# Patient Record
Sex: FEMALE | Race: WHITE | NOT HISPANIC OR LATINO | Employment: OTHER | URBAN - METROPOLITAN AREA
[De-identification: names, ages, dates, MRNs, and addresses within clinical notes are randomized per-mention and may not be internally consistent; named-entity substitution may affect disease eponyms.]

---

## 2020-01-24 ENCOUNTER — APPOINTMENT (OUTPATIENT)
Dept: PREADMISSION TESTING | Facility: HOSPITAL | Age: 70
End: 2020-01-24

## 2020-01-27 ENCOUNTER — APPOINTMENT (OUTPATIENT)
Dept: PREADMISSION TESTING | Facility: HOSPITAL | Age: 70
End: 2020-01-27

## 2020-01-27 ENCOUNTER — HOSPITAL ENCOUNTER (OUTPATIENT)
Dept: GENERAL RADIOLOGY | Facility: HOSPITAL | Age: 70
Discharge: HOME | End: 2020-01-27
Admitting: ORTHOPAEDIC SURGERY

## 2020-01-27 VITALS — WEIGHT: 152.78 LBS | HEIGHT: 62 IN | BODY MASS INDEX: 28.11 KG/M2

## 2020-01-27 LAB
ALBUMIN SERPL-MCNC: 4.7 G/DL (ref 3.5–5.2)
ALBUMIN/GLOB SERPL: 1.8 G/DL
ALP SERPL-CCNC: 77 U/L (ref 39–117)
ALT SERPL W P-5'-P-CCNC: 24 U/L (ref 1–33)
ANION GAP SERPL CALCULATED.3IONS-SCNC: 13 MMOL/L (ref 5–15)
APTT PPP: 28.1 SECONDS (ref 24–37)
AST SERPL-CCNC: 30 U/L (ref 1–32)
BACTERIA UR QL AUTO: NORMAL /HPF
BASOPHILS # BLD AUTO: 0.1 10*3/MM3 (ref 0–0.2)
BASOPHILS NFR BLD AUTO: 1.3 % (ref 0–1.5)
BILIRUB SERPL-MCNC: 0.3 MG/DL (ref 0.2–1.2)
BILIRUB UR QL STRIP: NEGATIVE
BUN BLD-MCNC: 24 MG/DL (ref 8–23)
BUN/CREAT SERPL: 21.4 (ref 7–25)
CALCIUM SPEC-SCNC: 10 MG/DL (ref 8.6–10.5)
CHLORIDE SERPL-SCNC: 102 MMOL/L (ref 98–107)
CLARITY UR: CLEAR
CO2 SERPL-SCNC: 27 MMOL/L (ref 22–29)
COLOR UR: YELLOW
CREAT BLD-MCNC: 1.12 MG/DL (ref 0.57–1)
DEPRECATED RDW RBC AUTO: 43.7 FL (ref 37–54)
EOSINOPHIL # BLD AUTO: 0.28 10*3/MM3 (ref 0–0.4)
EOSINOPHIL NFR BLD AUTO: 3.6 % (ref 0.3–6.2)
ERYTHROCYTE [DISTWIDTH] IN BLOOD BY AUTOMATED COUNT: 13.5 % (ref 12.3–15.4)
GFR SERPL CREATININE-BSD FRML MDRD: 49 ML/MIN/1.73
GLOBULIN UR ELPH-MCNC: 2.6 GM/DL
GLUCOSE BLD-MCNC: 97 MG/DL (ref 65–99)
GLUCOSE UR STRIP-MCNC: NEGATIVE MG/DL
HBA1C MFR BLD: 5.6 % (ref 4.8–5.6)
HCT VFR BLD AUTO: 39.1 % (ref 34–46.6)
HGB BLD-MCNC: 12.5 G/DL (ref 12–15.9)
HGB UR QL STRIP.AUTO: NEGATIVE
HYALINE CASTS UR QL AUTO: NORMAL /LPF
IMM GRANULOCYTES # BLD AUTO: 0.02 10*3/MM3 (ref 0–0.05)
IMM GRANULOCYTES NFR BLD AUTO: 0.3 % (ref 0–0.5)
INR PPP: 1.06 (ref 0.85–1.16)
KETONES UR QL STRIP: NEGATIVE
LEUKOCYTE ESTERASE UR QL STRIP.AUTO: NEGATIVE
LYMPHOCYTES # BLD AUTO: 3.21 10*3/MM3 (ref 0.7–3.1)
LYMPHOCYTES NFR BLD AUTO: 41.5 % (ref 19.6–45.3)
MCH RBC QN AUTO: 28.2 PG (ref 26.6–33)
MCHC RBC AUTO-ENTMCNC: 32 G/DL (ref 31.5–35.7)
MCV RBC AUTO: 88.1 FL (ref 79–97)
MONOCYTES # BLD AUTO: 0.37 10*3/MM3 (ref 0.1–0.9)
MONOCYTES NFR BLD AUTO: 4.8 % (ref 5–12)
NEUTROPHILS # BLD AUTO: 3.76 10*3/MM3 (ref 1.7–7)
NEUTROPHILS NFR BLD AUTO: 48.5 % (ref 42.7–76)
NITRITE UR QL STRIP: NEGATIVE
NRBC BLD AUTO-RTO: 0 /100 WBC (ref 0–0.2)
PH UR STRIP.AUTO: <=5 [PH] (ref 5–8)
PLATELET # BLD AUTO: 253 10*3/MM3 (ref 140–450)
PMV BLD AUTO: 10.4 FL (ref 6–12)
POTASSIUM BLD-SCNC: 4.5 MMOL/L (ref 3.5–5.2)
PROT SERPL-MCNC: 7.3 G/DL (ref 6–8.5)
PROT UR QL STRIP: NEGATIVE
PROTHROMBIN TIME: 13.3 SECONDS (ref 11.2–14.3)
RBC # BLD AUTO: 4.44 10*6/MM3 (ref 3.77–5.28)
RBC # UR: NORMAL /HPF
REF LAB TEST METHOD: NORMAL
SODIUM BLD-SCNC: 142 MMOL/L (ref 136–145)
SP GR UR STRIP: 1.02 (ref 1–1.03)
SQUAMOUS #/AREA URNS HPF: NORMAL /HPF
UROBILINOGEN UR QL STRIP: NORMAL
WBC NRBC COR # BLD: 7.74 10*3/MM3 (ref 3.4–10.8)
WBC UR QL AUTO: NORMAL /HPF

## 2020-01-27 PROCEDURE — 83036 HEMOGLOBIN GLYCOSYLATED A1C: CPT | Performed by: ORTHOPAEDIC SURGERY

## 2020-01-27 PROCEDURE — 85610 PROTHROMBIN TIME: CPT | Performed by: ORTHOPAEDIC SURGERY

## 2020-01-27 PROCEDURE — 85025 COMPLETE CBC W/AUTO DIFF WBC: CPT | Performed by: ORTHOPAEDIC SURGERY

## 2020-01-27 PROCEDURE — 81001 URINALYSIS AUTO W/SCOPE: CPT | Performed by: ORTHOPAEDIC SURGERY

## 2020-01-27 PROCEDURE — 85730 THROMBOPLASTIN TIME PARTIAL: CPT | Performed by: ORTHOPAEDIC SURGERY

## 2020-01-27 PROCEDURE — 71046 X-RAY EXAM CHEST 2 VIEWS: CPT

## 2020-01-27 PROCEDURE — 36415 COLL VENOUS BLD VENIPUNCTURE: CPT

## 2020-01-27 PROCEDURE — 80053 COMPREHEN METABOLIC PANEL: CPT | Performed by: ORTHOPAEDIC SURGERY

## 2020-01-27 RX ORDER — MELOXICAM 15 MG/1
15 TABLET ORAL DAILY
COMMUNITY

## 2020-01-27 RX ORDER — LEVOTHYROXINE SODIUM 0.1 MG/1
100 TABLET ORAL DAILY
COMMUNITY

## 2020-01-27 RX ORDER — AMLODIPINE BESYLATE 5 MG/1
5 TABLET ORAL DAILY
COMMUNITY

## 2020-01-27 RX ORDER — ROSUVASTATIN CALCIUM 5 MG/1
5 TABLET, COATED ORAL DAILY
COMMUNITY

## 2020-01-30 ENCOUNTER — ANESTHESIA EVENT (OUTPATIENT)
Dept: PERIOP | Facility: HOSPITAL | Age: 70
End: 2020-01-30

## 2020-01-30 RX ORDER — FAMOTIDINE 10 MG/ML
20 INJECTION, SOLUTION INTRAVENOUS ONCE
Status: CANCELLED | OUTPATIENT
Start: 2020-01-30 | End: 2020-01-30

## 2020-01-31 ENCOUNTER — ANESTHESIA (OUTPATIENT)
Dept: PERIOP | Facility: HOSPITAL | Age: 70
End: 2020-01-31

## 2020-01-31 ENCOUNTER — HOSPITAL ENCOUNTER (INPATIENT)
Facility: HOSPITAL | Age: 70
LOS: 1 days | Discharge: HOME | End: 2020-02-01
Attending: ORTHOPAEDIC SURGERY | Admitting: ORTHOPAEDIC SURGERY

## 2020-01-31 ENCOUNTER — APPOINTMENT (OUTPATIENT)
Dept: GENERAL RADIOLOGY | Facility: HOSPITAL | Age: 70
End: 2020-01-31

## 2020-01-31 DIAGNOSIS — Z74.09 IMPAIRED MOBILITY AND ADLS: ICD-10-CM

## 2020-01-31 DIAGNOSIS — Z96.612 S/P REVERSE TOTAL SHOULDER ARTHROPLASTY, LEFT: Primary | ICD-10-CM

## 2020-01-31 DIAGNOSIS — Z74.1 REQUIRES ASSISTANCE WITH ACTIVITIES OF DAILY LIVING (ADL): ICD-10-CM

## 2020-01-31 DIAGNOSIS — Z78.9 IMPAIRED MOBILITY AND ADLS: ICD-10-CM

## 2020-01-31 PROBLEM — E78.5 HYPERLIPIDEMIA: Status: ACTIVE | Noted: 2020-01-31

## 2020-01-31 PROBLEM — M25.519 SHOULDER PAIN: Status: ACTIVE | Noted: 2020-01-31

## 2020-01-31 PROBLEM — E03.9 HYPOTHYROID: Status: ACTIVE | Noted: 2020-01-31

## 2020-01-31 PROBLEM — N28.9 RENAL INSUFFICIENCY: Status: ACTIVE | Noted: 2020-01-31

## 2020-01-31 PROBLEM — I10 HTN (HYPERTENSION): Status: ACTIVE | Noted: 2020-01-31

## 2020-01-31 PROCEDURE — 25010000003 CEFAZOLIN IN DEXTROSE 2-4 GM/100ML-% SOLUTION: Performed by: ORTHOPAEDIC SURGERY

## 2020-01-31 PROCEDURE — 25010000002 ROPIVACINE HCL-NACL: Performed by: NURSE ANESTHETIST, CERTIFIED REGISTERED

## 2020-01-31 PROCEDURE — 25010000002 BUPRENORPHINE PER 0.1 MG: Performed by: ANESTHESIOLOGY

## 2020-01-31 PROCEDURE — 25010000002 ONDANSETRON PER 1 MG: Performed by: ORTHOPAEDIC SURGERY

## 2020-01-31 PROCEDURE — 25010000002 VANCOMYCIN 1 G RECONSTITUTED SOLUTION: Performed by: ORTHOPAEDIC SURGERY

## 2020-01-31 PROCEDURE — 25010000002 PROPOFOL 10 MG/ML EMULSION: Performed by: NURSE ANESTHETIST, CERTIFIED REGISTERED

## 2020-01-31 PROCEDURE — 25010000002 DEXAMETHASONE PER 1 MG: Performed by: NURSE ANESTHETIST, CERTIFIED REGISTERED

## 2020-01-31 PROCEDURE — 25010000002 PHENYLEPHRINE PER 1 ML: Performed by: NURSE ANESTHETIST, CERTIFIED REGISTERED

## 2020-01-31 PROCEDURE — 25010000002 ONDANSETRON PER 1 MG: Performed by: NURSE ANESTHETIST, CERTIFIED REGISTERED

## 2020-01-31 PROCEDURE — 25010000002 NEOSTIGMINE 10 MG/10ML SOLUTION: Performed by: NURSE ANESTHETIST, CERTIFIED REGISTERED

## 2020-01-31 PROCEDURE — 94799 UNLISTED PULMONARY SVC/PX: CPT

## 2020-01-31 PROCEDURE — 25010000002 DEXAMETHASONE SODIUM PHOSPHATE 10 MG/ML SOLUTION: Performed by: ANESTHESIOLOGY

## 2020-01-31 PROCEDURE — C1776 JOINT DEVICE (IMPLANTABLE): HCPCS | Performed by: ORTHOPAEDIC SURGERY

## 2020-01-31 PROCEDURE — 25010000002 FENTANYL CITRATE (PF) 100 MCG/2ML SOLUTION: Performed by: ANESTHESIOLOGY

## 2020-01-31 PROCEDURE — 73020 X-RAY EXAM OF SHOULDER: CPT

## 2020-01-31 PROCEDURE — 0RRK00Z REPLACEMENT OF LEFT SHOULDER JOINT WITH REVERSE BALL AND SOCKET SYNTHETIC SUBSTITUTE, OPEN APPROACH: ICD-10-PCS | Performed by: ORTHOPAEDIC SURGERY

## 2020-01-31 DEVICE — BASEPLT SHLDR AEQUALIS FUL/WEDGE AUG 15D 25MM: Type: IMPLANTABLE DEVICE | Site: SHOULDER | Status: FUNCTIONAL

## 2020-01-31 DEVICE — SCRW AEQUALIS PERFORM PERIPH 5X26MM: Type: IMPLANTABLE DEVICE | Site: SHOULDER | Status: FUNCTIONAL

## 2020-01-31 DEVICE — IMPLANTABLE DEVICE
Type: IMPLANTABLE DEVICE | Site: SHOULDER | Status: FUNCTIONAL
Brand: FLEX SHOULDER SYSTEM

## 2020-01-31 DEVICE — GLENOSPHERE SHLDR AEQUALIS PERFORM STD 36MM: Type: IMPLANTABLE DEVICE | Site: SHOULDER | Status: FUNCTIONAL

## 2020-01-31 DEVICE — IMPLANTABLE DEVICE: Type: IMPLANTABLE DEVICE | Site: SHOULDER | Status: FUNCTIONAL

## 2020-01-31 DEVICE — SCRW AEQUALIS PERFORM CENTRL 6.5X30MM: Type: IMPLANTABLE DEVICE | Site: SHOULDER | Status: FUNCTIONAL

## 2020-01-31 DEVICE — SCRW AEQUALIS PERFORM PERIPH 5X30MM: Type: IMPLANTABLE DEVICE | Site: SHOULDER | Status: FUNCTIONAL

## 2020-01-31 DEVICE — IMPLANTABLE DEVICE
Type: IMPLANTABLE DEVICE | Site: SHOULDER | Status: FUNCTIONAL
Brand: AEQUALIS™ ASCEND™ FLEX

## 2020-01-31 RX ORDER — POVIDONE-IODINE 10 MG/G
OINTMENT TOPICAL AS NEEDED
Status: DISCONTINUED | OUTPATIENT
Start: 2020-01-31 | End: 2020-01-31 | Stop reason: HOSPADM

## 2020-01-31 RX ORDER — BUPIVACAINE HYDROCHLORIDE 2.5 MG/ML
INJECTION, SOLUTION EPIDURAL; INFILTRATION; INTRACAUDAL
Status: COMPLETED | OUTPATIENT
Start: 2020-01-31 | End: 2020-01-31

## 2020-01-31 RX ORDER — ACETAMINOPHEN 325 MG/1
650 TABLET ORAL EVERY 4 HOURS PRN
Status: DISCONTINUED | OUTPATIENT
Start: 2020-01-31 | End: 2020-02-01 | Stop reason: HOSPADM

## 2020-01-31 RX ORDER — ROSUVASTATIN CALCIUM 10 MG/1
5 TABLET, COATED ORAL NIGHTLY
Status: DISCONTINUED | OUTPATIENT
Start: 2020-01-31 | End: 2020-02-01 | Stop reason: HOSPADM

## 2020-01-31 RX ORDER — LIDOCAINE HYDROCHLORIDE 10 MG/ML
0.5 INJECTION, SOLUTION EPIDURAL; INFILTRATION; INTRACAUDAL; PERINEURAL ONCE AS NEEDED
Status: COMPLETED | OUTPATIENT
Start: 2020-01-31 | End: 2020-01-31

## 2020-01-31 RX ORDER — LEVOTHYROXINE SODIUM 0.1 MG/1
100 TABLET ORAL
Status: DISCONTINUED | OUTPATIENT
Start: 2020-01-31 | End: 2020-02-01 | Stop reason: HOSPADM

## 2020-01-31 RX ORDER — SODIUM CHLORIDE 0.9 % (FLUSH) 0.9 %
10 SYRINGE (ML) INJECTION EVERY 12 HOURS SCHEDULED
Status: DISCONTINUED | OUTPATIENT
Start: 2020-01-31 | End: 2020-01-31 | Stop reason: HOSPADM

## 2020-01-31 RX ORDER — CEFAZOLIN SODIUM 2 G/100ML
2 INJECTION, SOLUTION INTRAVENOUS EVERY 8 HOURS
Status: COMPLETED | OUTPATIENT
Start: 2020-01-31 | End: 2020-02-01

## 2020-01-31 RX ORDER — NALOXONE HCL 0.4 MG/ML
0.4 VIAL (ML) INJECTION AS NEEDED
Status: DISCONTINUED | OUTPATIENT
Start: 2020-01-31 | End: 2020-01-31 | Stop reason: HOSPADM

## 2020-01-31 RX ORDER — AMLODIPINE BESYLATE 5 MG/1
5 TABLET ORAL DAILY
Status: DISCONTINUED | OUTPATIENT
Start: 2020-01-31 | End: 2020-02-01 | Stop reason: HOSPADM

## 2020-01-31 RX ORDER — ONDANSETRON 2 MG/ML
4 INJECTION INTRAMUSCULAR; INTRAVENOUS ONCE AS NEEDED
Status: DISCONTINUED | OUTPATIENT
Start: 2020-01-31 | End: 2020-01-31 | Stop reason: HOSPADM

## 2020-01-31 RX ORDER — OXYCODONE HYDROCHLORIDE 5 MG/1
10 TABLET ORAL EVERY 4 HOURS PRN
Status: DISCONTINUED | OUTPATIENT
Start: 2020-01-31 | End: 2020-02-01 | Stop reason: HOSPADM

## 2020-01-31 RX ORDER — LIDOCAINE HYDROCHLORIDE 10 MG/ML
INJECTION, SOLUTION EPIDURAL; INFILTRATION; INTRACAUDAL; PERINEURAL AS NEEDED
Status: DISCONTINUED | OUTPATIENT
Start: 2020-01-31 | End: 2020-01-31 | Stop reason: SURG

## 2020-01-31 RX ORDER — FENTANYL CITRATE 50 UG/ML
INJECTION, SOLUTION INTRAMUSCULAR; INTRAVENOUS
Status: COMPLETED | OUTPATIENT
Start: 2020-01-31 | End: 2020-01-31

## 2020-01-31 RX ORDER — SODIUM CHLORIDE 450 MG/100ML
50 INJECTION, SOLUTION INTRAVENOUS CONTINUOUS
Status: DISCONTINUED | OUTPATIENT
Start: 2020-01-31 | End: 2020-02-01 | Stop reason: HOSPADM

## 2020-01-31 RX ORDER — DEXAMETHASONE SODIUM PHOSPHATE 10 MG/ML
INJECTION, SOLUTION INTRAMUSCULAR; INTRAVENOUS
Status: COMPLETED | OUTPATIENT
Start: 2020-01-31 | End: 2020-01-31

## 2020-01-31 RX ORDER — SODIUM CHLORIDE, SODIUM LACTATE, POTASSIUM CHLORIDE, CALCIUM CHLORIDE 600; 310; 30; 20 MG/100ML; MG/100ML; MG/100ML; MG/100ML
9 INJECTION, SOLUTION INTRAVENOUS CONTINUOUS
Status: DISCONTINUED | OUTPATIENT
Start: 2020-01-31 | End: 2020-01-31

## 2020-01-31 RX ORDER — PROPOFOL 10 MG/ML
VIAL (ML) INTRAVENOUS AS NEEDED
Status: DISCONTINUED | OUTPATIENT
Start: 2020-01-31 | End: 2020-01-31 | Stop reason: SURG

## 2020-01-31 RX ORDER — MEPERIDINE HYDROCHLORIDE 25 MG/ML
12.5 INJECTION INTRAMUSCULAR; INTRAVENOUS; SUBCUTANEOUS
Status: DISCONTINUED | OUTPATIENT
Start: 2020-01-31 | End: 2020-01-31 | Stop reason: HOSPADM

## 2020-01-31 RX ORDER — LABETALOL HYDROCHLORIDE 5 MG/ML
10 INJECTION, SOLUTION INTRAVENOUS EVERY 4 HOURS PRN
Status: DISCONTINUED | OUTPATIENT
Start: 2020-01-31 | End: 2020-02-01 | Stop reason: HOSPADM

## 2020-01-31 RX ORDER — KETOROLAC TROMETHAMINE 15 MG/ML
15 INJECTION, SOLUTION INTRAMUSCULAR; INTRAVENOUS EVERY 6 HOURS PRN
Status: DISCONTINUED | OUTPATIENT
Start: 2020-01-31 | End: 2020-02-01 | Stop reason: HOSPADM

## 2020-01-31 RX ORDER — ACETAMINOPHEN 325 MG/1
650 TABLET ORAL ONCE
Status: COMPLETED | OUTPATIENT
Start: 2020-01-31 | End: 2020-01-31

## 2020-01-31 RX ORDER — HYDROMORPHONE HYDROCHLORIDE 1 MG/ML
0.5 INJECTION, SOLUTION INTRAMUSCULAR; INTRAVENOUS; SUBCUTANEOUS
Status: DISCONTINUED | OUTPATIENT
Start: 2020-01-31 | End: 2020-01-31 | Stop reason: HOSPADM

## 2020-01-31 RX ORDER — PREGABALIN 75 MG/1
75 CAPSULE ORAL ONCE
Status: COMPLETED | OUTPATIENT
Start: 2020-01-31 | End: 2020-01-31

## 2020-01-31 RX ORDER — ONDANSETRON 2 MG/ML
INJECTION INTRAMUSCULAR; INTRAVENOUS AS NEEDED
Status: DISCONTINUED | OUTPATIENT
Start: 2020-01-31 | End: 2020-01-31 | Stop reason: SURG

## 2020-01-31 RX ORDER — NEOSTIGMINE METHYLSULFATE 1 MG/ML
INJECTION, SOLUTION INTRAVENOUS AS NEEDED
Status: DISCONTINUED | OUTPATIENT
Start: 2020-01-31 | End: 2020-01-31 | Stop reason: SURG

## 2020-01-31 RX ORDER — OXYCODONE HYDROCHLORIDE 5 MG/1
5 TABLET ORAL EVERY 4 HOURS PRN
Status: DISCONTINUED | OUTPATIENT
Start: 2020-01-31 | End: 2020-02-01 | Stop reason: HOSPADM

## 2020-01-31 RX ORDER — ONDANSETRON 4 MG/1
4 TABLET, FILM COATED ORAL EVERY 6 HOURS PRN
Status: DISCONTINUED | OUTPATIENT
Start: 2020-01-31 | End: 2020-02-01 | Stop reason: HOSPADM

## 2020-01-31 RX ORDER — ROCURONIUM BROMIDE 10 MG/ML
INJECTION, SOLUTION INTRAVENOUS AS NEEDED
Status: DISCONTINUED | OUTPATIENT
Start: 2020-01-31 | End: 2020-01-31 | Stop reason: SURG

## 2020-01-31 RX ORDER — DOCUSATE SODIUM 100 MG/1
100 CAPSULE, LIQUID FILLED ORAL 2 TIMES DAILY PRN
Status: DISCONTINUED | OUTPATIENT
Start: 2020-01-31 | End: 2020-02-01 | Stop reason: HOSPADM

## 2020-01-31 RX ORDER — VANCOMYCIN HYDROCHLORIDE 1 G/20ML
INJECTION, POWDER, LYOPHILIZED, FOR SOLUTION INTRAVENOUS AS NEEDED
Status: DISCONTINUED | OUTPATIENT
Start: 2020-01-31 | End: 2020-01-31 | Stop reason: HOSPADM

## 2020-01-31 RX ORDER — NALOXONE HCL 0.4 MG/ML
0.1 VIAL (ML) INJECTION
Status: DISCONTINUED | OUTPATIENT
Start: 2020-01-31 | End: 2020-02-01 | Stop reason: HOSPADM

## 2020-01-31 RX ORDER — FAMOTIDINE 20 MG/1
20 TABLET, FILM COATED ORAL ONCE
Status: COMPLETED | OUTPATIENT
Start: 2020-01-31 | End: 2020-01-31

## 2020-01-31 RX ORDER — BISACODYL 5 MG/1
10 TABLET, DELAYED RELEASE ORAL DAILY PRN
Status: DISCONTINUED | OUTPATIENT
Start: 2020-01-31 | End: 2020-02-01 | Stop reason: HOSPADM

## 2020-01-31 RX ORDER — GLYCOPYRROLATE 0.2 MG/ML
INJECTION INTRAMUSCULAR; INTRAVENOUS AS NEEDED
Status: DISCONTINUED | OUTPATIENT
Start: 2020-01-31 | End: 2020-01-31 | Stop reason: SURG

## 2020-01-31 RX ORDER — CEFAZOLIN SODIUM 2 G/100ML
2 INJECTION, SOLUTION INTRAVENOUS ONCE
Status: COMPLETED | OUTPATIENT
Start: 2020-01-31 | End: 2020-01-31

## 2020-01-31 RX ORDER — SODIUM CHLORIDE 0.9 % (FLUSH) 0.9 %
10 SYRINGE (ML) INJECTION AS NEEDED
Status: DISCONTINUED | OUTPATIENT
Start: 2020-01-31 | End: 2020-01-31 | Stop reason: HOSPADM

## 2020-01-31 RX ORDER — LABETALOL HYDROCHLORIDE 5 MG/ML
5 INJECTION, SOLUTION INTRAVENOUS
Status: DISCONTINUED | OUTPATIENT
Start: 2020-01-31 | End: 2020-01-31 | Stop reason: HOSPADM

## 2020-01-31 RX ORDER — EPHEDRINE SULFATE 50 MG/ML
5 INJECTION, SOLUTION INTRAVENOUS ONCE AS NEEDED
Status: DISCONTINUED | OUTPATIENT
Start: 2020-01-31 | End: 2020-01-31 | Stop reason: HOSPADM

## 2020-01-31 RX ORDER — SODIUM CHLORIDE, SODIUM LACTATE, POTASSIUM CHLORIDE, CALCIUM CHLORIDE 600; 310; 30; 20 MG/100ML; MG/100ML; MG/100ML; MG/100ML
100 INJECTION, SOLUTION INTRAVENOUS CONTINUOUS
Status: DISCONTINUED | OUTPATIENT
Start: 2020-01-31 | End: 2020-02-01 | Stop reason: HOSPADM

## 2020-01-31 RX ORDER — DEXAMETHASONE SODIUM PHOSPHATE 4 MG/ML
INJECTION, SOLUTION INTRA-ARTICULAR; INTRALESIONAL; INTRAMUSCULAR; INTRAVENOUS; SOFT TISSUE AS NEEDED
Status: DISCONTINUED | OUTPATIENT
Start: 2020-01-31 | End: 2020-01-31 | Stop reason: SURG

## 2020-01-31 RX ORDER — BUPRENORPHINE HYDROCHLORIDE 0.32 MG/ML
INJECTION INTRAMUSCULAR; INTRAVENOUS
Status: COMPLETED | OUTPATIENT
Start: 2020-01-31 | End: 2020-01-31

## 2020-01-31 RX ORDER — MAGNESIUM HYDROXIDE 1200 MG/15ML
LIQUID ORAL AS NEEDED
Status: DISCONTINUED | OUTPATIENT
Start: 2020-01-31 | End: 2020-01-31 | Stop reason: HOSPADM

## 2020-01-31 RX ORDER — ONDANSETRON 2 MG/ML
4 INJECTION INTRAMUSCULAR; INTRAVENOUS EVERY 6 HOURS PRN
Status: DISCONTINUED | OUTPATIENT
Start: 2020-01-31 | End: 2020-02-01 | Stop reason: HOSPADM

## 2020-01-31 RX ORDER — FENTANYL CITRATE 50 UG/ML
50 INJECTION, SOLUTION INTRAMUSCULAR; INTRAVENOUS
Status: DISCONTINUED | OUTPATIENT
Start: 2020-01-31 | End: 2020-01-31 | Stop reason: HOSPADM

## 2020-01-31 RX ADMIN — ONDANSETRON 4 MG: 2 INJECTION INTRAMUSCULAR; INTRAVENOUS at 14:03

## 2020-01-31 RX ADMIN — ACETAMINOPHEN 650 MG: 325 TABLET ORAL at 06:39

## 2020-01-31 RX ADMIN — FAMOTIDINE 20 MG: 20 TABLET, FILM COATED ORAL at 06:39

## 2020-01-31 RX ADMIN — PROPOFOL 150 MG: 10 INJECTION, EMULSION INTRAVENOUS at 07:32

## 2020-01-31 RX ADMIN — SODIUM CHLORIDE, POTASSIUM CHLORIDE, SODIUM LACTATE AND CALCIUM CHLORIDE: 600; 310; 30; 20 INJECTION, SOLUTION INTRAVENOUS at 09:15

## 2020-01-31 RX ADMIN — LIDOCAINE HYDROCHLORIDE 50 MG: 10 INJECTION, SOLUTION EPIDURAL; INFILTRATION; INTRACAUDAL; PERINEURAL at 07:32

## 2020-01-31 RX ADMIN — BUPIVACAINE HYDROCHLORIDE 30 ML: 2.5 INJECTION, SOLUTION EPIDURAL; INFILTRATION; INTRACAUDAL; PERINEURAL at 07:33

## 2020-01-31 RX ADMIN — AMLODIPINE BESYLATE 5 MG: 5 TABLET ORAL at 11:53

## 2020-01-31 RX ADMIN — GLYCOPYRROLATE 0.4 MG: 0.2 INJECTION, SOLUTION INTRAMUSCULAR; INTRAVENOUS at 09:16

## 2020-01-31 RX ADMIN — FENTANYL CITRATE 100 MCG: 50 INJECTION, SOLUTION INTRAMUSCULAR; INTRAVENOUS at 06:55

## 2020-01-31 RX ADMIN — PHENYLEPHRINE HYDROCHLORIDE 100 MCG: 10 INJECTION INTRAVENOUS at 07:47

## 2020-01-31 RX ADMIN — CEFAZOLIN SODIUM 2 G: 2 INJECTION, SOLUTION INTRAVENOUS at 14:49

## 2020-01-31 RX ADMIN — PREGABALIN 75 MG: 75 CAPSULE ORAL at 06:39

## 2020-01-31 RX ADMIN — ROSUVASTATIN CALCIUM 5 MG: 10 TABLET, COATED ORAL at 21:01

## 2020-01-31 RX ADMIN — CEFAZOLIN SODIUM 2 G: 2 INJECTION, SOLUTION INTRAVENOUS at 07:27

## 2020-01-31 RX ADMIN — LIDOCAINE HYDROCHLORIDE 0.5 ML: 10 INJECTION, SOLUTION EPIDURAL; INFILTRATION; INTRACAUDAL; PERINEURAL at 06:40

## 2020-01-31 RX ADMIN — LEVOTHYROXINE SODIUM 100 MCG: 100 TABLET ORAL at 11:53

## 2020-01-31 RX ADMIN — SODIUM CHLORIDE 50 ML/HR: 4.5 INJECTION, SOLUTION INTRAVENOUS at 11:53

## 2020-01-31 RX ADMIN — TRANEXAMIC ACID 1000 MG: 100 INJECTION, SOLUTION INTRAVENOUS at 08:57

## 2020-01-31 RX ADMIN — PHENYLEPHRINE HYDROCHLORIDE 100 MCG: 10 INJECTION INTRAVENOUS at 07:43

## 2020-01-31 RX ADMIN — ROPIVACAINE HYDROCHLORIDE 6 ML/HR: 5 INJECTION, SOLUTION EPIDURAL; INFILTRATION; PERINEURAL at 09:18

## 2020-01-31 RX ADMIN — DEXAMETHASONE SODIUM PHOSPHATE 8 MG: 4 INJECTION, SOLUTION INTRAMUSCULAR; INTRAVENOUS at 07:32

## 2020-01-31 RX ADMIN — ONDANSETRON 4 MG: 2 INJECTION INTRAMUSCULAR; INTRAVENOUS at 08:57

## 2020-01-31 RX ADMIN — CEFAZOLIN SODIUM 2 G: 2 INJECTION, SOLUTION INTRAVENOUS at 23:48

## 2020-01-31 RX ADMIN — BUPIVACAINE HYDROCHLORIDE 15 ML: 2.5 INJECTION, SOLUTION EPIDURAL; INFILTRATION; INTRACAUDAL; PERINEURAL at 06:55

## 2020-01-31 RX ADMIN — NEOSTIGMINE METHYLSULFATE 3 MG: 1 INJECTION, SOLUTION INTRAVENOUS at 09:16

## 2020-01-31 RX ADMIN — ROCURONIUM BROMIDE 40 MG: 10 INJECTION INTRAVENOUS at 07:32

## 2020-01-31 RX ADMIN — TRANEXAMIC ACID 1000 MG: 100 INJECTION, SOLUTION INTRAVENOUS at 07:36

## 2020-01-31 RX ADMIN — SODIUM CHLORIDE, POTASSIUM CHLORIDE, SODIUM LACTATE AND CALCIUM CHLORIDE 9 ML/HR: 600; 310; 30; 20 INJECTION, SOLUTION INTRAVENOUS at 06:40

## 2020-01-31 RX ADMIN — PHENYLEPHRINE HYDROCHLORIDE 100 MCG: 10 INJECTION INTRAVENOUS at 08:13

## 2020-01-31 RX ADMIN — BUPRENORPHINE HYDROCHLORIDE 0.3 MG: 0.32 INJECTION INTRAMUSCULAR; INTRAVENOUS at 07:33

## 2020-01-31 RX ADMIN — DEXAMETHASONE SODIUM PHOSPHATE 2 MG: 10 INJECTION INTRAMUSCULAR; INTRAVENOUS at 07:33

## 2020-01-31 NOTE — ANESTHESIA POSTPROCEDURE EVALUATION
Patient: Sandra Dolan    Procedure Summary     Date:  01/31/20 Room / Location:   [facility]  /  [facility]    Anesthesia Start:  0727 Anesthesia Stop:      Procedure:  TOTAL SHOULDER REVERSE ARTHROPLASTY WITH AUGMENTATION LEFT (Left Shoulder) Diagnosis:       Glenohumeral arthritis, left      (glenohumeral arthritis left)    Surgeon:  Mich Kendrick MD Provider:  Elliot Ren MD    Anesthesia Type:  general ASA Status:  2          Anesthesia Type: general    Vitals  Vitals Value Taken Time   BP     Temp     Pulse 99 1/31/2020  9:33 AM   Resp     SpO2     Vitals shown include unvalidated device data.        Post Anesthesia Care and Evaluation    Patient location during evaluation: PACU  Patient participation: complete - patient participated  Level of consciousness: awake and alert  Pain score: 0  Pain management: adequate  Airway patency: patent  Anesthetic complications: No anesthetic complications  PONV Status: none  Cardiovascular status: hemodynamically stable and acceptable  Respiratory status: nonlabored ventilation, acceptable and nasal cannula  Hydration status: acceptable

## 2020-01-31 NOTE — ANESTHESIA PROCEDURE NOTES
Airway  Urgency: elective    Date/Time: 1/31/2020 7:34 AM  Airway not difficult    General Information and Staff    Patient location during procedure: OR  CRNA: Paul Chris CRNA    Indications and Patient Condition  Indications for airway management: airway protection    Preoxygenated: yes  MILS not maintained throughout  Mask difficulty assessment: 1 - vent by mask    Final Airway Details  Final airway type: endotracheal airway      Successful airway: ETT  Cuffed: yes   Successful intubation technique: direct laryngoscopy  Endotracheal tube insertion site: oral  Blade: Haas  Blade size: 2  ETT size (mm): 7.0  Cormack-Lehane Classification: grade I - full view of glottis  Placement verified by: chest auscultation and capnometry   Cuff volume (mL): 5  Measured from: lips  ETT/EBT  to lips (cm): 21  Number of attempts at approach: 1    Additional Comments  Negative epigastric sounds, Breath sound equal bilaterally with symmetric chest rise and fall

## 2020-01-31 NOTE — ANESTHESIA PROCEDURE NOTES
Peripheral Block      Patient reassessed immediately prior to procedure    Patient location during procedure: OR  Reason for block: at surgeon's request and post-op pain management  Performed by  Anesthesiologist: Elliot Ren MD  Preanesthetic Checklist  Completed: patient identified, site marked, surgical consent, pre-op evaluation, timeout performed, IV checked, risks and benefits discussed and monitors and equipment checked  Prep:  Pt Position: supine  Sterile barriers:cap, gloves, gown and mask  Prep: ChloraPrep  Patient monitoring: blood pressure monitoring, continuous pulse oximetry and EKG  Procedure  Performed under: general  Guidance:ultrasound guided and landmark technique  Images:still images obtained, printed/placed on chart    Laterality:left  Block Type:PECS I and PECS II  Injection Technique:single-shot  Needle Type:short-bevel  Needle Gauge:20 G  Resistance on Injection: none    Medications Used: bupivacaine PF (MARCAINE) 0.25 % injection, 30 mL  dexamethasone sodium phosphate injection, 2 mg  buprenorphine (BUPRENEX) injection, 0.3 mg  Med admintered at 1/31/2020 7:33 AM      Medications  Preservative Free Saline:10ml  Comment:Block Injection:  Total volume of LA divided between Right and Left sided blocks         Post Assessment  Injection Assessment: negative aspiration for heme and incremental injection  Patient Tolerance:comfortable throughout block  Complications:no  Additional Notes  The pt. Was placed in the Supine Position and GA was induced     The insertion site was prepped with CHG and Ultrasound guidance with In-Plane techniquewas  a 4inch BBraun 360 degree echogenic needle was visualized.  Normal Saline PSF was  utilized for hydrodissection of tissue. PECS 1 Block- Pectoralis Major and Minor where identified and LA was injected between PMM and PmM at the level of the 3rd Rib(10ml),  PECS 2-  Pectoralis Minor and Serratus muscle where identified and the needle was advanced  laterally in-plane with the 4th rib as a backstop, pleura was monitored.  LA was injected between SA and PmM at the level of 4th rib( 20ml).  LA injection spread was visualized, injection was incremental 1-5ml, normal or low injection pressure, no intravascular injection, no pneumothorax appreciated.  Thank You.

## 2020-01-31 NOTE — ANESTHESIA PROCEDURE NOTES
Peripheral Block      Patient reassessed immediately prior to procedure    Patient location during procedure: pre-op  Reason for block: at surgeon's request and post-op pain management  Performed by  CRNA: Paul Chris CRNA  Preanesthetic Checklist  Completed: patient identified, site marked, surgical consent, pre-op evaluation, timeout performed, IV checked, risks and benefits discussed and monitors and equipment checked  Prep:  Sterile barriers:cap, gloves, mask and sterile barriers  Prep: ChloraPrep  Patient monitoring: blood pressure monitoring, continuous pulse oximetry and EKG  Procedure  Sedation:yes  Performed under: local infiltration  Guidance:ultrasound guided  Images:still images obtained, printed/placed on chart    Laterality:left  Block Type:interscalene  Injection Technique:catheter  Needle Type:Tuohy and echogenic  Needle Gauge:18 G  Resistance on Injection: none  Catheter Size:20 G (20g)  Cath Depth at skin: 7 cm    Medications Used: fentaNYL citrate (PF) (SUBLIMAZE) injection, 100 mcg  bupivacaine PF (MARCAINE) 0.25 % injection, 15 mL  Med admintered at 1/31/2020 6:55 AM      Post Assessment  Injection Assessment: negative aspiration for heme, no paresthesia on injection and incremental injection  Patient Tolerance:comfortable throughout block  Complications:no  Additional Notes  Procedure:                 The pt was placed in semifowlers position with a slight tilt of the thorax contralateral to the insertion site.  The Insertion Site was prepped and draped in sterile fashion.  The pt was anesthetized with  IV Sedation( see meds) and  Skin and cutaneous tissue was infiltrated and anesthetized with 1% Lidocaine 3 mls via a 25g needle.  Utilizing ultrasound guidance, a BBraun 2 inch 18 g Contiplex echogenic touhy needle was advanced in-plane.  Hydro dissection of tissue was achieved with Normal saline. Major vessels(carotid and Internal Jugular) where visualized as the brachial plexus was  approached at the approximate level of C-7/ T-1.  Cervical 5 and Branches of Cervical 6 nerve roots where visualized and the needle tip was placed posterior at the level of C-6 roots.  LA spread was visualized and injection was made incrementally every 5 mls with aspiration. Injection pressure was normal or little, there was no intraneural injection, no vascular injection.      The BBraun 20 g wire stylet  catheter was then placed under US guidance on the posterior aspect of the Brachial Plexus.  Location of catheter was confirmed with NS injection visualized with US . The tuohy was then removed and the skin was sealed with Skin AFix at catheter insertion site.  Skin was prepped with mastisol and the labeled catheter  was secured with steristrips and a CHG tegaderm. Thank You.

## 2020-01-31 NOTE — ANESTHESIA PREPROCEDURE EVALUATION
Anesthesia Evaluation     Patient summary reviewed and Nursing notes reviewed   history of anesthetic complications: PONV               Airway   Mallampati: II  TM distance: >3 FB  Neck ROM: full  No difficulty expected  Dental - normal exam     Pulmonary - negative pulmonary ROS and normal exam   Cardiovascular - normal exam    (+) hypertension, hyperlipidemia,       Neuro/Psych- negative ROS  GI/Hepatic/Renal/Endo    (+)   thyroid problem hypothyroidism    Musculoskeletal     Abdominal  - normal exam    Bowel sounds: normal.   Substance History - negative use     OB/GYN negative ob/gyn ROS         Other   arthritis,                      Anesthesia Plan    ASA 2     general   (Peripheral nerve block + cath for post op pain relief)  intravenous induction     Anesthetic plan, all risks, benefits, and alternatives have been provided, discussed and informed consent has been obtained with: patient.    Plan discussed with CRNA.

## 2020-02-01 VITALS
BODY MASS INDEX: 28.11 KG/M2 | RESPIRATION RATE: 16 BRPM | TEMPERATURE: 98.4 F | WEIGHT: 152.78 LBS | OXYGEN SATURATION: 96 % | HEART RATE: 89 BPM | SYSTOLIC BLOOD PRESSURE: 137 MMHG | HEIGHT: 62 IN | DIASTOLIC BLOOD PRESSURE: 68 MMHG

## 2020-02-01 LAB
ANION GAP SERPL CALCULATED.3IONS-SCNC: 13 MMOL/L (ref 5–15)
BASOPHILS # BLD AUTO: 0.02 10*3/MM3 (ref 0–0.2)
BASOPHILS NFR BLD AUTO: 0.2 % (ref 0–1.5)
BUN BLD-MCNC: 22 MG/DL (ref 8–23)
BUN/CREAT SERPL: 22.9 (ref 7–25)
CALCIUM SPEC-SCNC: 9.1 MG/DL (ref 8.6–10.5)
CHLORIDE SERPL-SCNC: 103 MMOL/L (ref 98–107)
CO2 SERPL-SCNC: 24 MMOL/L (ref 22–29)
CREAT BLD-MCNC: 0.96 MG/DL (ref 0.57–1)
DEPRECATED RDW RBC AUTO: 44.2 FL (ref 37–54)
EOSINOPHIL # BLD AUTO: 0 10*3/MM3 (ref 0–0.4)
EOSINOPHIL NFR BLD AUTO: 0 % (ref 0.3–6.2)
ERYTHROCYTE [DISTWIDTH] IN BLOOD BY AUTOMATED COUNT: 13.5 % (ref 12.3–15.4)
GFR SERPL CREATININE-BSD FRML MDRD: 58 ML/MIN/1.73
GLUCOSE BLD-MCNC: 146 MG/DL (ref 65–99)
HCT VFR BLD AUTO: 36.9 % (ref 34–46.6)
HGB BLD-MCNC: 11.8 G/DL (ref 12–15.9)
IMM GRANULOCYTES # BLD AUTO: 0.17 10*3/MM3 (ref 0–0.05)
IMM GRANULOCYTES NFR BLD AUTO: 1.3 % (ref 0–0.5)
LYMPHOCYTES # BLD AUTO: 1.05 10*3/MM3 (ref 0.7–3.1)
LYMPHOCYTES NFR BLD AUTO: 8.2 % (ref 19.6–45.3)
MCH RBC QN AUTO: 28.4 PG (ref 26.6–33)
MCHC RBC AUTO-ENTMCNC: 32 G/DL (ref 31.5–35.7)
MCV RBC AUTO: 88.9 FL (ref 79–97)
MONOCYTES # BLD AUTO: 0.38 10*3/MM3 (ref 0.1–0.9)
MONOCYTES NFR BLD AUTO: 3 % (ref 5–12)
NEUTROPHILS # BLD AUTO: 11.25 10*3/MM3 (ref 1.7–7)
NEUTROPHILS NFR BLD AUTO: 87.3 % (ref 42.7–76)
NRBC BLD AUTO-RTO: 0 /100 WBC (ref 0–0.2)
PLAT MORPH BLD: NORMAL
PLATELET # BLD AUTO: 231 10*3/MM3 (ref 140–450)
PMV BLD AUTO: 10.8 FL (ref 6–12)
POTASSIUM BLD-SCNC: 4.5 MMOL/L (ref 3.5–5.2)
RBC # BLD AUTO: 4.15 10*6/MM3 (ref 3.77–5.28)
RBC MORPH BLD: NORMAL
SODIUM BLD-SCNC: 140 MMOL/L (ref 136–145)
WBC MORPH BLD: NORMAL
WBC NRBC COR # BLD: 12.87 10*3/MM3 (ref 3.4–10.8)

## 2020-02-01 PROCEDURE — 80048 BASIC METABOLIC PNL TOTAL CA: CPT | Performed by: ORTHOPAEDIC SURGERY

## 2020-02-01 PROCEDURE — 85025 COMPLETE CBC W/AUTO DIFF WBC: CPT | Performed by: ORTHOPAEDIC SURGERY

## 2020-02-01 PROCEDURE — 97165 OT EVAL LOW COMPLEX 30 MIN: CPT

## 2020-02-01 PROCEDURE — 85007 BL SMEAR W/DIFF WBC COUNT: CPT | Performed by: ORTHOPAEDIC SURGERY

## 2020-02-01 PROCEDURE — 97530 THERAPEUTIC ACTIVITIES: CPT

## 2020-02-01 RX ORDER — OXYCODONE HYDROCHLORIDE 5 MG/1
5 TABLET ORAL EVERY 4 HOURS PRN
Qty: 40 TABLET | Refills: 0 | Status: SHIPPED | OUTPATIENT
Start: 2020-02-01 | End: 2020-02-10

## 2020-02-01 RX ADMIN — AMLODIPINE BESYLATE 5 MG: 5 TABLET ORAL at 08:53

## 2020-02-01 RX ADMIN — LEVOTHYROXINE SODIUM 100 MCG: 100 TABLET ORAL at 05:37

## 2020-02-01 RX ADMIN — ACETAMINOPHEN 650 MG: 325 TABLET, FILM COATED ORAL at 08:54

## 2020-02-01 NOTE — PROGRESS NOTES
Orthopedic Daily Progress Note      CC: reverse TSA    Pain well controlled  General: no fevers, chills  Abdomen: no nausea, vomiting, or diarrhea    No other complaints    Physical Exam:  I have reviewed the vital signs.  Temp:  [97 °F (36.1 °C)-98.8 °F (37.1 °C)] 98.4 °F (36.9 °C)  Heart Rate:  [] 89  Resp:  [14-18] 16  BP: (112-142)/(62-76) 137/68    Objective  General Appearance:    Alert, cooperative, no distress  Extremities: No clubbing, cyanosis, or edema to lower extremities  Pulses:  2+ in distal surgical extremity  Skin: Dressing Clean/dry/intact      Results Review:    I have reviewed the labs, radiology results and diagnostic studies:    Results from last 7 days   Lab Units 02/01/20  0535   WBC 10*3/mm3 12.87*   HEMOGLOBIN g/dL 11.8*   PLATELETS 10*3/mm3 231     Results from last 7 days   Lab Units 02/01/20  0535   SODIUM mmol/L 140   POTASSIUM mmol/L 4.5   CO2 mmol/L 24.0   CREATININE mg/dL 0.96   GLUCOSE mg/dL 146*       I have reviewed the medications.    Assessment/Problem List  POD# 1 Day Post-Op   S/p reverse TSA    Plan  1) ot  2) limit narcotics  3) sling at all times except bathing dressing and changing        Discharge Planning: I expect patient to be discharged to home today.    Mich Kendrick MD  02/01/20  8:15 AM

## 2020-02-01 NOTE — THERAPY DISCHARGE NOTE
Acute Care - Occupational Therapy Initial Eval/Discharge  [facility]     Patient Name: Sandra Dolan  : 1950  MRN: [id number]  Today's Date: 2020  Onset of Illness/Injury or Date of Surgery: 20  Date of Referral to OT: 20  Referring Physician: Aroldo      Admit Date: 2020       ICD-10-CM ICD-9-CM   1. Requires assistance with activities of daily living (ADL) Z74.1 V60.89   2. Impaired mobility and ADLs Z74.09 799.89     Patient Active Problem List   Diagnosis   • Shoulder pain   • S/P reverse total shoulder arthroplasty, left   • HTN (hypertension)   • Hypothyroid   • Hyperlipidemia   • Renal insufficiency     Past Medical History:   Diagnosis Date   • Arthritis    • Cancer (CMS/HCC)     ovarian   • Disease of thyroid gland    • Elevated cholesterol    • Hypertension    • PONV (postoperative nausea and vomiting)     only with Hysterectomy in    • Wears glasses      Past Surgical History:   Procedure Laterality Date   • APPENDECTOMY     • COLONOSCOPY     • HYSTERECTOMY     • JOINT REPLACEMENT     • THYROIDECTOMY, PARTIAL     • TONSILLECTOMY     • TOTAL HIP ARTHROPLASTY Bilateral    • TOTAL SHOULDER REVERSE ARTHROPLASTY Right           OT ASSESSMENT FLOWSHEET (last 12 hours)      Occupational Therapy Evaluation     Row Name 20 0916                   OT Evaluation Time/Intention    Subjective Information  no complaints  -TB        Document Type  evaluation;discharge evaluation/summary  -TB        Mode of Treatment  occupational therapy;individual therapy  -TB        Patient Effort  good  -TB        Symptoms Noted During/After Treatment  none  -TB        Comment  Pt passed mobility screen, no PT warranted; no AD, no LOB, no h/o falls and room air sats stable on exertion.  -TB           General Information    Patient Profile Reviewed?  yes  -TB        Onset of Illness/Injury or Date of Surgery  20  -TB        Referring Physician  Aroldo  -TB        Patient Observations   alert;cooperative;agree to therapy  -TB        Patient/Family Observations  Spouse present for teaching; pt has h/o R R TSA 2 years ago and spouse feels confident to care for pt at home  -TB        Equipment Currently Used at Home  bath bench  -TB        Pertinent History of Current Functional Problem  s/p L R TSA; h/o R R TSA with excellent recovery  -TB        Existing Precautions/Restrictions  left;shoulder;other (see comments) LUE: shoulder precautions, ARROW, Sling AAT, NWB  -TB        Limitations/Impairments  safety/cognitive  -TB        Barriers to Rehab  none identified  -TB           Relationship/Environment    Primary Source of Support/Comfort  spouse  -TB        Name of Support/Comfort Primary Source  Lorenzo  -TB        Lives With  spouse  -TB        Family Caregiver if Needed  spouse  -TB        Primary Roles/Responsibilities  retired  -TB           Resource/Environmental Concerns    Current Living Arrangements  home/apartment/condo  -TB           Stairs Within Home, Primary    Stairs Comment, Within Home, Primary  Bed and BR upstairs; spouse will SBA for safety  -TB           Cognitive Assessment/Intervention- PT/OT    Orientation Status (Cognition)  oriented x 4  -TB        Follows Commands (Cognition)  WFL  -TB        Safety Deficit (Cognitive)  mild deficit;safety precautions awareness;safety precautions follow-through/compliance  -TB        Cognitive Assessment/Intervention Comment  Review of shoulder precautions needed to reinforce learning  -TB           Safety Issues, Functional Mobility    Safety Issues Affecting Function (Mobility)  safety precaution awareness;safety precautions follow-through/compliance  -TB        Comment, Safety Issues/Impairments (Mobility)  shoulder precautions  -TB           Mobility Assessment/Treatment    Extremity Weight-bearing Status  left upper extremity  -TB        Left Upper Extremity (Weight-bearing Status)  non weight-bearing (NWB)  -TB           Bed Mobility  Assessment/Treatment    Comment (Bed Mobility)  Pt rec'vd standing in room  -TB           Functional Mobility    Functional Mobility- Ind. Level  standby assist  -TB        Functional Mobility-Distance (Feet)  150  -TB        Functional Mobility- Comment  Pt passed mobility screen, no PT warranted; no AD, no LOB, no h/o falls and room air sats stable on exertion.  -TB           Transfer Assessment/Treatment    Transfer Assessment/Treatment  sit-stand transfer;stand-sit transfer;bed-chair transfer  -TB        Maintains Weight-bearing Status (Transfers)  verbal cues to maintain  -TB           Bed-Chair Transfer    Bed-Chair [place] (Transfers)  stand by assist  -TB           Sit-Stand Transfer    Sit-Stand [place] (Transfers)  stand by assist  -TB           Stand-Sit Transfer    Stand-Sit [place] (Transfers)  stand by assist  -TB           ADL Assessment/Intervention    BADL Assessment/Intervention  bathing;upper body dressing;lower body dressing;feeding  -TB           Bathing Assessment/Intervention    Bathing [place] Level  upper body;bathing skills;moderate assist (50% patient effort);verbal cues  -TB        Assistive Devices (Bathing)  one hand technique  -TB        Bathing Position  edge of bed sitting  -TB        Comment (Bathing)  Education completed for RUE shoulder and ARROW precautions with ADLs and R axilla care via gentle forward pendulum (simulated); good teach back  -TB           Upper Body Dressing Assessment/Training    Upper Body Dressing [place] Level  upper body dressing skills;doff;pajama/robe;don;front opening garment;maximum assist (25% patient effort);verbal cues  -TB        Assistive Devices (Upper Body Dressing)  one hand technique  -TB        Upper Body Dressing Position  edge of bed sitting  -TB        Comment (Upper Body Dressing)  Education completed for RUE sling mgmt/proper fit/wear schedule, ARROW mgmt, and adaptive dressing strategies; good teach back   -TB            Lower Body Dressing Assessment/Training    Lower Body Dressing [place] Level  don;shoes/slippers;moderate assist (50% patient effort)  -TB           Self-Feeding Assessment/Training    [place] Level (Feeding)  independent;liquids to mouth  -TB        Comment (Feeding)  mild coughing noted with thin liquids, RN notified - RN to follow up  -TB           BADL Safety/Performance    Impairments, BADL Safety/Performance  other (see comments);pain;range of motion;strength;coordination shoulder precautions  -TB        Skilled BADL Treatment/Intervention  betsey/one-hand technique;BADL process/adaptation training  -TB           General ROM    GENERAL ROM COMMENTS  h/o R R TSA with excellent recovery; LUE AROM e/w/hand WFL, pt tolerates gentle LUE forward pendulum for ADLs  -TB           MMT (Manual Muscle Testing)    General MMT Comments  RUE WFL for ADL; LUE deferred  -TB           Motor Assessment/Interventions    Additional Documentation  Balance (Group);Therapeutic Exercise (Group)  -TB           Therapeutic Exercise    Therapeutic Exercise  seated, upper extremities  -TB        Additional Documentation  Therapeutic Exercise (Row)  -TB        09695 - OT Therapeutic Activity Minutes  30  -TB           Upper Extremity Seated Therapeutic Exercise    Performed, Seated Upper Extremity (Therapeutic Exercise)  elbow flexion/extension;forearm supination/pronation;wrist flexion/extension;digit flexion/extension  -TB        Exercise Type, Seated Upper Extremity (Therapeutic Exercise)  AROM (active range of motion)  -TB        Restrictions, Seated Upper Extremity (Therapeutic Exercise)  AROM LUE e/w/hand only per MD orders  -TB        Sets/Reps Detail, Seated Upper Extremity (Therapeutic Exercise)  1x10  -TB        Comment, Seated Upper Extremity (Therapeutic Exercise)  Education completed for LUE AROM HEP per md orders; pt/spouse demonstrate good teach back  -TB           Balance    Balance  dynamic sitting  balance;dynamic standing balance  -TB           Dynamic Sitting Balance    Level of [place], Reaches Outside Midline (Sitting, Dynamic Balance)  supervision  -TB        Sitting Position, Reaches Outside Midline (Sitting, Dynamic Balance)  sitting on edge of bed;sitting in chair  -TB        Comment, Reaches Outside Midline (Sitting, Dynamic Balance)  ADL tasks, HEP  -TB           Dynamic Standing Balance    Level of [place], Reaches Outside Midline (Standing, Dynamic Balance)  standby assist  -TB        Time Able to Maintain Position, Reaches Outside Midline (Standing, Dynamic Balance)  more than 5 minutes  -TB        Comment, Reaches Outside Midline (Standing, Dynamic Balance)  Pt passed mobility screen, no PT warranted; no AD, no LOB, no h/o falls and room air sats stable on exertion.  -TB           Sensory Assessment/Intervention    Sensory General Assessment  light touch sensation deficits identified  -TB           Light Touch Sensation Assessment    Left Upper Extremity: Light Touch Sensation Assessment  mild impairment, 75% or more correct responses  -TB        Comment, Left Upper Extremity: Light Touch Sensation Assessment  L interscalene block  -TB        Right Upper Extremity: Light Touch Sensation Assessment  intact  -TB           Positioning and Restraints    Pre-Treatment Position  standing in room  -TB        Post Treatment Position  chair  -TB        In Chair  notified nsg;reclined;call light within reach;encouraged to call for assist;with family/caregiver;legs elevated Sling and ARROW intact  -TB           Pain Assessment    Additional Documentation  Pain Scale: Numbers Pre/Post-Treatment (Group)  -TB           Pain Scale: Numbers Pre/Post-Treatment    Pain Scale: Numbers, Pretreatment  0/10 - no pain  -TB        Pain Scale: Numbers, Post-Treatment  2/10  -TB        Pain Location - Side  Left  -TB        Pain Location - Orientation  other (see comments) axillary  -TB        Pain Location   shoulder  -TB        Pre/Post Treatment Pain Comment  Pt tolerates ADL and HEP well  -TB        Pain Intervention(s)  Ambulation/increased activity;Repositioned;Medication (See MAR) L interscalene block  -TB           Orthotics & Prosthetics Management    Orthosis Location  support device  -TB        Additional Documentation  Orthosis Location (Row)  -TB           Support Device Management    Type (Support/Positioning Device)  betsey shoulder sling;shoulder abduction pillow  -TB        Functional Design (Support/Positioning Device)  static orthosis  -TB        Therapeutic Indications (Support/Positioning Device)  post-op positioning/protection;stabilization and support  -TB        Wearing Schedule (Support/Positioning Device)  remove for exercise;remove for hygiene/bathing  -TB        Orthosis Training (Support/Positioning Device)  patient and caregiver;activity limitations/precautions;donning/doffing orthosis;orthosis adjustment;purpose/goals of orthosis;sleeping precautions;wearing schedule;able to show back training;able to teach back training;meets goals/outcomes  -TB        Compliance/Wearing Issues (Support/Positioning Device)  patient/caregiver comprehend strategies;patient/caregiver comprehend rationale for orthosis  -TB           Wound 01/31/20 Left anterior;lateral shoulder Incision    Wound - Properties Group Date first assessed: 01/31/20  -NH Present on Hospital Admission: N  -NH Side: Left  -NH Orientation: anterior;lateral  -NH Location: shoulder  -NH Primary Wound Type: Incision  -NH       Coping    Observed Emotional State  accepting;cooperative  -TB        Verbalized Emotional State  acceptance;hopefulness  -TB           Plan of Care Review    Plan of Care Reviewed With  patient;spouse  -TB           Clinical Impression (OT)    Date of Referral to OT  01/31/20  -TB        OT Diagnosis  Pt requires assistance with all ADLs  -TB        Patient/Family Goals Statement (OT Eval)  to return home today with  spouse assist  -TB        Therapy Frequency (OT Eval)  evaluation only  -TB        Care Plan Review (OT)  evaluation/treatment results reviewed;care plan/treatment goals reviewed;risks/benefits reviewed;patient/other agree to care plan  -TB        Care Plan Review, Other Participant (OT Eval)  spouse  -TB        Anticipated Equipment Needs at Discharge (OT)  -- None  -TB        Anticipated Discharge Disposition (OT)  home with assist  -TB           Vital Signs    Pre Systolic BP Rehab  -- RN cleared OT, VSS stable  -TB        Pretreatment Heart Rate (beats/min)  94  -TB        Intratreatment Heart Rate (beats/min)  118  -TB        Posttreatment Heart Rate (beats/min)  95  -TB        Pre SpO2 (%)  92  -TB        O2 Delivery Pre Treatment  room air  -TB        Intra SpO2 (%)  91  -TB        O2 Delivery Intra Treatment  room air  -TB        Post SpO2 (%)  94  -TB        O2 Delivery Post Treatment  room air  -TB        Pre Patient Position  Standing  -TB        Intra Patient Position  Standing  -TB        Post Patient Position  Sitting  -TB           Discharge Summary (Occupational Therapy)    Additional Documentation  Discharge Summary, OT Eval (Group)  -TB           Discharge Summary, OT Eval    Reason for Discharge (OT Discharge Summary)  patient discharged from this facility Pt set to d/c home today with spouse assist  -TB           Living Environment    Home Accessibility  stairs to enter home;stairs within home walk-in shower with bench  -TB          User Key  (r) = Recorded By, (t) = Taken By, (c) = Cosigned By    Initials Name Effective Dates    TB Akiko Rodriguez, OT 06/08/18 -     Kenisha Frances RN 10/11/19 -               OT Recommendation and Plan  Outcome Summary/Treatment Plan (OT)  Anticipated Equipment Needs at Discharge (OT): (None)  Anticipated Discharge Disposition (OT): home with assist  Reason for Discharge (OT Discharge Summary): patient discharged from this facility(Pt set to d/c home  today with spouse assist)  Therapy Frequency (OT Eval): evaluation only  Plan of Care Review  Plan of Care Reviewed With: patient, spouse  Plan of Care Reviewed With: patient, spouse  Outcome Summary: OT IE completed. No POC established as pt set to d/c home today with spouse assist. Pt passed mobility screen, pain is well controlled and O2 sats are stable on exertion. HEP completed with good teach back. Education completed for all LUE related ADL care needs, sling and ARROW mgmt and home safety/fall prevention. OT will d/c at this time.         Outcome Measures     Row Name 02/01/20 0916             How much help from another is currently needed...    Putting on and taking off regular lower body clothing?  2  -TB      Bathing (including washing, rinsing, and drying)  2  -TB      Toileting (which includes using toilet bed pan or urinal)  2  -TB      Putting on and taking off regular upper body clothing  2  -TB      Taking care of personal grooming (such as brushing teeth)  3  -TB      Eating meals  3  -TB      AM-PAC 6 Clicks Score (OT)  14  -TB         Functional Assessment    Outcome Measure Options  AM-PAC 6 Clicks Daily Activity (OT)  -TB        User Key  (r) = Recorded By, (t) = Taken By, (c) = Cosigned By    Initials Name Provider Type    Akiko White OT Occupational Therapist          Time Calculation:   Time Calculation- OT     Row Name 02/01/20 1028 02/01/20 0916          Time Calculation- OT    OT Start Time  0916  -TB  --     OT Received On  02/01/20  -TB  --        Timed Charges    64918 - OT Therapeutic Activity Minutes  --  30  -TB       User Key  (r) = Recorded By, (t) = Taken By, (c) = Cosigned By    Initials Name Provider Type    Akiko White OT Occupational Therapist        Therapy Suggested Charges     Code   Minutes Charges    45752 (CPT®) Hc Ot Neuromusc Re Education Ea 15 Min      40137 (CPT®) Hc Ot Ther Proc Ea 15 Min      12561 (CPT®) Hc Ot Therapeutic Act Ea 15  Min 30 2    99080 (CPT®) Hc Ot Manual Therapy Ea 15 Min      85345 (CPT®) Hc Ot Iontophoresis Ea 15 Min      45471 (CPT®) Hc Ot Elec Stim Ea-Per 15 Min      45308 (CPT®) Hc Ot Ultrasound Ea 15 Min      04782 (CPT®) Hc Ot Self Care/Mgmt/Train Ea 15 Min      Total  30 2        Therapy Charges for Today     Code Description Service Date Service Provider Modifiers Qty    18481103549 HC OT THERAPEUTIC ACT EA 15 MIN 2/1/2020 Akiko Rodriguez OT GO 2    [id number]  OT EVAL LOW COMPLEXITY 3 2/1/2020 Akiko Rodriguez OT GO 1               OT Discharge Summary  Anticipated Discharge Disposition (OT): home with assist    Akiko Rodriguez OT  2/1/2020

## 2020-02-01 NOTE — PLAN OF CARE
Problem: Patient Care Overview  Goal: Plan of Care Review  Outcome: Ongoing (interventions implemented as appropriate)  Flowsheets (Taken 2/1/2020 3079)  Progress: improving  Plan of Care Reviewed With: patient  Outcome Summary: Pt alert and oriented. Pain controlled with nerve block. Nerve block 6ml/hr. VSS. Slept well this night. Voiding well. Saline locked. Will continue to monitor.

## 2020-02-01 NOTE — PROGRESS NOTES
[facility]    Acute pain service Inpatient Progress Note    Patient Name: Sandra Dolan  :  1950  MRN:  [id number]        Acute Pain  Service Inpatient Progress Note:    Analgesia:Good  LOC: alert and awake  Resp Status: room air  Cardiac: VS stable  Side Effects:None  Catheter Site:clean, dressing intact and dry  Cath type: peripheral nerve cath with ON Q  Infusion rate: 10ml/hr  Catheter Plan:Catheter to remain Insitu, Continue catheter infusion rate unchanged and Bolus Catheter  Comments: ---------------------------------------------------------------------------------  Physical Therapy Manual Muscle Testing Results:   ]    Physical Therapy - Plan of Care Review - Outcome Summary:   ]    Occupational Therapy - Plan of Care Review - Outcome Summary:   ]  ----------------------------------------------------------------------------------

## 2020-02-01 NOTE — PLAN OF CARE
Problem: Patient Care Overview  Goal: Plan of Care Review  Flowsheets (Taken 2/1/2020 8757)  Outcome Summary: OT IE completed. No POC established as pt set to d/c home today with spouse assist. Pt passed mobility screen, pain is well controlled and O2 sats are stable on exertion. HEP completed with good teach back. Education completed for all LUE related ADL care needs, sling and ARROW mgmt and home safety/fall prevention. OT will d/c at this time.

## 2020-02-01 NOTE — PLAN OF CARE
Problem: Patient Care Overview  Goal: Plan of Care Review  Outcome: Outcome(s) achieved  Flowsheets  Taken 2/1/2020 1203 by Raegan Claudio RN  Plan of Care Reviewed With: patient  Taken 2/1/2020 0916 by Akiko Rodriguez OT  Outcome Summary: OT IE completed. No POC established as pt set to d/c home today with spouse assist. Pt passed mobility screen, pain is well controlled and O2 sats are stable on exertion. HEP completed with good teach back. Education completed for all LUE related ADL care needs, sling and ARROW mgmt and home safety/fall prevention. OT will d/c at this time.

## 2020-02-01 NOTE — PROGRESS NOTES
Continued Stay Note  [facility]     Patient Name: Sandra Dolan  MRN: [id number]  Today's Date: 2/1/2020    Admit Date: 1/31/2020    Discharge Plan     Row Name 02/01/20 1404       Plan    Plan  Home    Plan Comments  Patient was discharged and left floor before  could visit. Per OT notes, passed mobility screen,instructions provided by OT on sling ,no Home Health needs    Final Discharge Disposition Code  01 - home or self-care        Discharge Codes    No documentation.       Expected Discharge Date and Time     Expected Discharge Date Expected Discharge Time    Feb 1, 2020             Sonja C Kellerman, RN

## 2020-02-01 NOTE — PROGRESS NOTES
[facility]    Acute pain service Inpatient Progress Note    Patient Name: Sandra Dolan  :  1950  MRN:  [id number]        Acute Pain  Service Inpatient Progress Note:    Analgesia:Good  LOC: alert and awake  Resp Status: room air  Cardiac: VS stable  Side Effects:None  Catheter Site:clean, dressing intact and dry  Cath type: peripheral nerve cath with ON Q  Infusion rate: 10ml/hr  Catheter Plan:Catheter to remain Insitu and Continue catheter infusion rate unchanged  Comments: ---------------------------------------------------------------------------------  Physical Therapy Manual Muscle Testing Results:   ]    Physical Therapy - Plan of Care Review - Outcome Summary:   ]    Occupational Therapy - Plan of Care Review - Outcome Summary:   ]  ----------------------------------------------------------------------------------

## 2020-02-01 NOTE — DISCHARGE SUMMARY
Patient Name: Sandra Dolan  MRN: [id number]  : 1950  DOS: 2020    Attending: Mich Kendrick M*    Primary Care Provider: Laura Ahuja MD    Date of Admission:.2020  5:24 AM    Date of Discharge:  2020    Discharge Diagnosis:     S/P reverse total shoulder arthroplasty, left    Shoulder pain    HTN (hypertension)    Hypothyroid    Hyperlipidemia    Renal insufficiency    Leukocytosis, mild, likely reactive    ABLA, mild, asymptomatic    Acute postop pain    Hospital Course  Patient is a 69 y.o. female presented for left reverse total shoulder arthroplasty by Dr. Kendrick.     She underwent surgery under [facility]. She tolerated surgery well and was admitted for further medical management. Her shoulder has been painful with limited ROM for several years.     Patient was provided pain medications as needed for pain control, along with interscalene nerve block infusion of Ropivacaine.      Adjustments were made to pain medications to optimize postop pain management. Risks and benefits of opiate medications discussed with patient.    The patient was seen by OT and was taught exercises for her left arm.  The patient used an IS for atelectasis prophylaxis and mechanicals for DVT prophylaxis.  Home medications were resumed as appropriate, and labs were monitored and remained fairly stable.     With the progress she has made, she is ready for DC home today.    The patient will have an arrow pump ( instructed on it during this admit)    Discussed with patient regarding plan and she shows understanding and agreement.        Procedures Performed  DATE OF OPERATION: 20     PREOPERATIVE DIAGNOSIS: left shoulder glenohumeral arthritis with severe glenoid retroversion      POSTOPERATIVE DIAGNOSES:  same     PROCEDURES PERFORMED:  1.Left reverse total shoulder arthroplasty .          SURGEON: Mich Kendrick MD    Pertinent Test Results:    I reviewed the patient's new clinical results.   Results  "from last 7 days   Lab Units 20  0535 20  1525   WBC 10*3/mm3 12.87* 7.74   HEMOGLOBIN g/dL 11.8* 12.5   HEMATOCRIT % 36.9 39.1   PLATELETS 10*3/mm3 231 253     Results from last 7 days   Lab Units 20  0535 20  1525   SODIUM mmol/L 140 142   POTASSIUM mmol/L 4.5 4.5   CHLORIDE mmol/L 103 102   CO2 mmol/L 24.0 27.0   BUN mg/dL 22 24*   CREATININE mg/dL 0.96 1.12*   CALCIUM mg/dL 9.1 10.0   BILIRUBIN mg/dL  --  0.3   ALK PHOS U/L  --  77   ALT (SGPT) U/L  --  24   AST (SGOT) U/L  --  30   GLUCOSE mg/dL 146* 97     I reviewed the patient's new imaging including images and reports.          Occupational therapy: OT IE completed. No POC established as pt set to d/c home today with spouse assist. Pt passed mobility screen, pain is well controlled and O2 sats are stable on exertion. HEP completed with good teach back. Education completed for all LUE related ADL care needs, sling and ARROW mgmt and home safety/fall prevention. OT will d/c at this time.    Discharge Assessment:    Vital Signs  Visit Vitals  /68 (BP Location: Right arm, Patient Position: Lying)   Pulse 89   Temp 98.4 °F (36.9 °C) (Oral)   Resp 16   Ht 157.5 cm (62\")   Wt 69.3 kg (152 lb 12.5 oz)   SpO2 96%   BMI 27.94 kg/m²     Temp (24hrs), Av.8 °F (36.6 °C), Min:97.4 °F (36.3 °C), Max:98.4 °F (36.9 °C)      General Appearance:    Alert, cooperative, in no acute distress   Lungs:     Clear to auscultation,respirations regular, even and                   unlabored    Heart:    Regular rhythm and normal rate, normal S1 and S2, no            murmur, no gallop, no rub, no click   Abdomen:     Normal bowel sounds, no masses, no organomegaly, soft        non-tender, non-distended, no guarding, no rebound                 tenderness   Extremities:   LUE sling, nerve block present. Aquacel CDI. Good movement and cap refill left digits.    Pulses:   Pulses palpable and equal bilaterally   Skin:   No bleeding, bruising or rash "   Neurologic:   Cranial nerves 2 - 12 grossly intact, sensation intact, DTR        present and equal bilaterally       Discharge Disposition: Home    Discharge Medications     Discharge Medications      New Medications      Instructions Start Date   oxyCODONE 5 MG immediate release tablet  Commonly known as:  ROXICODONE   5 mg, Oral, Every 4 Hours PRN      Ropivacine HCl-NaCl  Commonly known as:  NAROPIN   6 mL/hr (12 mg/hr), Peripheral Nerve, Continuous         Continue These Medications      Instructions Start Date   amLODIPine 5 MG tablet  Commonly known as:  NORVASC   5 mg, Oral, Daily      COENZYME COMPOSITUM PO   1 tablet, Oral, Daily      CRESTOR 5 MG tablet  Generic drug:  rosuvastatin   5 mg, Oral, Daily      levothyroxine 100 MCG tablet  Commonly known as:  SYNTHROID, LEVOTHROID   100 mcg, Oral, Daily      meloxicam 15 MG tablet  Commonly known as:  MOBIC   15 mg, Oral, Daily      MULTIVITAL PO   1 dose, Oral, Daily      ondansetron 4 MG tablet  Commonly known as:  ZOFRAN   4 mg, Oral, Every 8 Hours PRN             Discharge Diet: Regular diet    Activity at Discharge: ERIC Greer    Follow-up Appointments  Dr. Kendrick per his orders    Discharge took over 30 min    Scribed for Dr. Kothari by MALINDA Banks. 2/4/2020  9:48 AM     Sabina Kothari MD  02/01/20  11:34 AM    Sabina BAEZA MD, personally performed the services described in this documentation as scribed by MALINDA Banks ,and it is both accurate and complete.

## 2020-02-01 NOTE — NURSING NOTE
Ump setting at 6 ml /hour watching teaching video now with  bedside all information instruction material given to follow along with video as well with bracelet for 24/7 hotline number [email or web address] rn 2/1/20 at 1019

## 2020-02-02 NOTE — PROGRESS NOTES
MITUL Leach    Nerve Cath Post Op Call    Patient Name: Sandra Dolan  :  1950  MRN:  [id number]  Date of Discharge: 2020    Nerve Cath Post Op Call:    Catheter Plan:Patient called/No answer/Message left to call [facility] pain service for any questions or complaints and The patient was instructed to call ON CALL Anesthesia provider for any questions or problems

## 2020-02-03 NOTE — PROGRESS NOTES
MITUL Leach    Nerve Cath Post Op Call    Patient Name: Sandra Dolan  :  1950  MRN:  [id number]  Date of Discharge: 2020    Nerve Cath Post Op Call:    Catheter Plan:Patient called/No answer/Message left to call [facility] pain service for any questions or complaints and The patient was instructed to call ON CALL Anesthesia provider for any questions or problems

## 2023-04-15 ENCOUNTER — RX ONLY (RX ONLY)
Age: 73
End: 2023-04-15

## 2025-06-16 ENCOUNTER — APPOINTMENT (OUTPATIENT)
Dept: URBAN - NONMETROPOLITAN AREA SURGERY 11 | Age: 75
Setting detail: DERMATOLOGY
End: 2025-06-18

## 2025-06-16 DIAGNOSIS — L28.1 PRURIGO NODULARIS: ICD-10-CM

## 2025-06-16 DIAGNOSIS — D49.2 NEOPLASM OF UNSPECIFIED BEHAVIOR OF BONE, SOFT TISSUE, AND SKIN: ICD-10-CM

## 2025-06-16 PROCEDURE — OTHER DEFER: OTHER

## 2025-06-16 PROCEDURE — OTHER REASSURANCE: OTHER

## 2025-06-16 PROCEDURE — OTHER COUNSELING: OTHER

## 2025-06-16 PROCEDURE — OTHER BIOPSY BY SHAVE METHOD: OTHER

## 2025-06-16 PROCEDURE — OTHER MIPS QUALITY: OTHER

## 2025-06-16 ASSESSMENT — LOCATION ZONE DERM
LOCATION ZONE: LIP
LOCATION ZONE: NECK

## 2025-06-16 ASSESSMENT — LOCATION SIMPLE DESCRIPTION DERM
LOCATION SIMPLE: LEFT ANTERIOR NECK
LOCATION SIMPLE: LEFT LIP
LOCATION SIMPLE: RIGHT ANTERIOR NECK
LOCATION SIMPLE: POSTERIOR NECK

## 2025-06-16 ASSESSMENT — LOCATION DETAILED DESCRIPTION DERM
LOCATION DETAILED: RIGHT INFERIOR ANTERIOR NECK
LOCATION DETAILED: RIGHT MEDIAL TRAPEZIAL NECK
LOCATION DETAILED: LEFT INFERIOR ANTERIOR NECK
LOCATION DETAILED: LEFT LOWER CUTANEOUS LIP

## 2025-07-08 ENCOUNTER — APPOINTMENT (OUTPATIENT)
Dept: URBAN - NONMETROPOLITAN AREA SURGERY 11 | Age: 75
Setting detail: DERMATOLOGY
End: 2025-07-08

## 2025-07-08 DIAGNOSIS — L739 UNSPECIFIED DISEASE OF SEBACEOUS GLANDS: ICD-10-CM

## 2025-07-08 PROBLEM — C44.02 SQUAMOUS CELL CARCINOMA OF SKIN OF LIP: Status: ACTIVE | Noted: 2025-07-08

## 2025-07-08 PROBLEM — D23.4 OTHER BENIGN NEOPLASM OF SKIN OF SCALP AND NECK: Status: ACTIVE | Noted: 2025-07-08

## 2025-07-08 PROCEDURE — OTHER ASC CONSULTATION: OTHER

## 2025-07-08 PROCEDURE — OTHER COUNSELING: OTHER

## 2025-07-08 PROCEDURE — OTHER MIPS QUALITY: OTHER

## 2025-07-08 PROCEDURE — OTHER PATHOLOGY DISCUSSION: OTHER

## 2025-07-08 PROCEDURE — OTHER ADDITIONAL NOTES: OTHER

## 2025-07-08 ASSESSMENT — LOCATION ZONE DERM: LOCATION ZONE: NECK

## 2025-07-08 ASSESSMENT — LOCATION SIMPLE DESCRIPTION DERM: LOCATION SIMPLE: LEFT ANTERIOR NECK

## 2025-07-08 ASSESSMENT — LOCATION DETAILED DESCRIPTION DERM: LOCATION DETAILED: LEFT INFERIOR ANTERIOR NECK

## (undated) DEVICE — UNDERGLV SURG BIOGEL INDICAT PI SZ8 BLU

## (undated) DEVICE — GLV SURG TRIUMPH ORTHO W/ALOE PF LTX 7.5 STRL

## (undated) DEVICE — 3M™ IOBAN™ 2 ANTIMICROBIAL INCISE DRAPE 6651EZ: Brand: IOBAN™ 2

## (undated) DEVICE — INTENDED FOR TISSUE SEPARATION, AND OTHER PROCEDURES THAT REQUIRE A SHARP SURGICAL BLADE TO PUNCTURE OR CUT.: Brand: BARD-PARKER ® CARBON RIB-BACK BLADES

## (undated) DEVICE — BIT DRL SCRW PERIPH 3.2MM

## (undated) DEVICE — GUIDEPIN AEQUALIS PERFORM PLS 2.5X220MM

## (undated) DEVICE — 1010 S-DRAPE TOWEL DRAPE 10/BX: Brand: STERI-DRAPE™

## (undated) DEVICE — SPNG GZ WOVN 4X4IN 12PLY 10/BX STRL

## (undated) DEVICE — DRAPE,REIN 53X77,STERILE: Brand: MEDLINE

## (undated) DEVICE — UNDERGLV SURG BIOGEL INDICAT PF 61/2 GRN

## (undated) DEVICE — T-MAX DISPOSABLE FACE MASK 8 PER BOX

## (undated) DEVICE — 3M™ STERI-DRAPE™ U-DRAPE 1015: Brand: STERI-DRAPE™

## (undated) DEVICE — SUT VIC 2/0 CT2 27IN J269H

## (undated) DEVICE — SOL ISO/ALC 70PCT 16OZ

## (undated) DEVICE — 3M™ IOBAN™ 2 ANTIMICROBIAL INCISE DRAPE 6650EZ: Brand: IOBAN™ 2

## (undated) DEVICE — PK MAJ SHLDR SPLT 10

## (undated) DEVICE — ANTIBACTERIAL UNDYED BRAIDED (POLYGLACTIN 910), SYNTHETIC ABSORBABLE SUTURE: Brand: COATED VICRYL

## (undated) DEVICE — GLV SURG PREMIERPRO MIC LTX PF SZ6.5 BRN

## (undated) DEVICE — PUMP PAIN AUTOFUSER AUTO SELCT NOBOLUS 1TO14ML/HR 550ML DISP

## (undated) DEVICE — PULLOVER TOGA, X-LARGE: Brand: FLYTE, SURGICOOL

## (undated) DEVICE — 450 ML BOTTLE OF 0.05% CHLORHEXIDINE GLUCONATE IN 99.95% STERILE WATER FOR IRRIGATION, USP AND APPLICATOR.: Brand: IRRISEPT ANTIMICROBIAL WOUND LAVAGE

## (undated) DEVICE — GLV SURG SENSICARE MICRO PF LF 7.5 STRL

## (undated) DEVICE — SWABSTK SKINPREP PVPI PRE/SAT 8IN STRL